# Patient Record
Sex: FEMALE | NOT HISPANIC OR LATINO | Employment: FULL TIME | URBAN - METROPOLITAN AREA
[De-identification: names, ages, dates, MRNs, and addresses within clinical notes are randomized per-mention and may not be internally consistent; named-entity substitution may affect disease eponyms.]

---

## 2018-02-09 ENCOUNTER — OFFICE VISIT (OUTPATIENT)
Dept: INTERNAL MEDICINE | Age: 33
End: 2018-02-09

## 2018-02-09 VITALS
BODY MASS INDEX: 29.49 KG/M2 | DIASTOLIC BLOOD PRESSURE: 70 MMHG | TEMPERATURE: 97.8 F | WEIGHT: 177 LBS | OXYGEN SATURATION: 96 % | HEART RATE: 88 BPM | HEIGHT: 65 IN | SYSTOLIC BLOOD PRESSURE: 110 MMHG

## 2018-02-09 DIAGNOSIS — R00.2 HEART PALPITATIONS: ICD-10-CM

## 2018-02-09 DIAGNOSIS — F98.8 ATTENTION DEFICIT DISORDER, UNSPECIFIED HYPERACTIVITY PRESENCE: Primary | ICD-10-CM

## 2018-02-09 DIAGNOSIS — E66.3 OVERWEIGHT (BMI 25.0-29.9): ICD-10-CM

## 2018-02-09 DIAGNOSIS — F41.3 OTHER MIXED ANXIETY DISORDERS: ICD-10-CM

## 2018-02-09 PROCEDURE — 99205 OFFICE O/P NEW HI 60 MIN: CPT | Performed by: INTERNAL MEDICINE

## 2018-02-09 PROCEDURE — 93000 ELECTROCARDIOGRAM COMPLETE: CPT | Performed by: INTERNAL MEDICINE

## 2018-02-09 RX ORDER — ESCITALOPRAM OXALATE 10 MG/1
10 TABLET ORAL EVERY MORNING
Qty: 30 TABLET | Refills: 1 | Status: SHIPPED | OUTPATIENT
Start: 2018-02-09 | End: 2018-04-30

## 2018-02-09 NOTE — ASSESSMENT & PLAN NOTE
Recommended neuropsych testing for ADD/ADHD. High probability. Medication would be helpful for her.

## 2018-02-09 NOTE — PROGRESS NOTES
"[facility] – [place], [place] INTERNAL MEDICINE  ALDO SORENSON M.D.      Christianne Barrett / 32 y.o. / female  02/09/2018    VITALS:    Visit Vitals   • /70   • Pulse 88   • Temp 97.8 °F (36.6 °C)   • Ht 165.1 cm (65\")   • Wt 80.3 kg (177 lb)   • SpO2 96%   • BMI 29.45 kg/m2       BP Readings from Last 3 Encounters:   02/09/18 110/70     Wt Readings from Last 3 Encounters:   02/09/18 80.3 kg (177 lb)      Body mass index is 29.45 kg/(m^2).    CC: Main reason(s) for today's visit: Establish Care; Palpitations; and Anxiety      HPI:    Patient is a 32 y.o. female who is here to establish care. 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 She c/o of chronic h/o anxiety for most of her adult life. She has had h/o postpartum depression with her 1st child 5 years ago, h/o what sounds like severe premenstrual mood symptoms. Strong family h/o \"anxiety disorder\" and ADD/ADHD in her mom, sister and MGM. She took on a new role at work as  a year ago and has felt overwhelmed and more stressed which is affecting her work performance and personal/marital life with  and her kids. She c/o difficulty remaining focused/attentive/sitting in one place to accomplish tasks. She always feels she is not completing her work tasks and feels overhwelmed w/ everyday home chores/activities. She has been angry/irritated and her  has referred to her as \"crazy.\" She has multiple mild/moderate phobias. Has been experiencing heart palpitations with fast beats up to 110s. Denies chest pain, bourgeois or faint feelings.       Patient Care Team:  Neeraj Sorenson MD as PCP - General (Internal Medicine)  Syd Locke MD as Consulting Physician (Obstetrics and Gynecology)  ____________________________________________________________________    ASSESSMENT & PLAN:    Problem List Items Addressed This Visit        High    Other mixed anxiety disorders    Current Assessment & Plan     Underlying anxiety disorder with worsening problem " relating to her likely ADD/ADHD.   Recommended CBT. Start escitalopram 10 mg 1/2 tab daily x 1 week then increase to 1 full tab. Instructed patient to watch for worsening mood symptoms, suicidal thoughts/ideations. Avoid caffeine much as possible.          Relevant Medications    escitalopram (LEXAPRO) 10 MG tablet    Other Relevant Orders    Celiac Ab tTG DGP TIgA    Heart palpitations    Current Assessment & Plan     Avoid caffeine. EKG today is benign. Check labs. Call if worse/ongoing.          Relevant Orders    Comprehensive Metabolic Panel    TSH+Free T4    CBC & Differential       Medium    Attention deficit disorder - Primary    Current Assessment & Plan     Recommended neuropsych testing for ADD/ADHD. High probability. Medication would be helpful for her.             Low    Overweight (BMI 25.0-29.9)    Current Assessment & Plan     Continue good lifestyle changes with diet/exercise.                 Summary/Discussion:     · Spent 60 minutes in face-to-face encounter time and >50% of time spent in counseling regarding above medical problems/issues.        Return in about 1 month (around 3/9/2018) for Anxiety.    Future Appointments  Date Time Provider Department Center   3/9/2018 2:40 PM Neeraj Sorenson MD [facility] None       ____________________________________________________________________        REVIEW OF SYSTEMS    Review of Systems   Constitutional: Negative.         Intended wt loss of 15 lbs with exercise/diet   HENT: Negative.    Eyes: Negative.    Respiratory: Negative.    Cardiovascular: Positive for palpitations. Negative for chest pain.   Gastrointestinal: Positive for constipation.   Endocrine: Negative.    Genitourinary: Negative.    Musculoskeletal: Negative.    Skin: Negative.    Allergic/Immunologic: Negative.    Neurological: Negative.    Hematological: Negative.    Psychiatric/Behavioral: Positive for agitation, decreased concentration and dysphoric mood. Negative for hallucinations,  self-injury, sleep disturbance and suicidal ideas. The patient is nervous/anxious.          PHYSICAL EXAMINATION    Physical Exam   Constitutional: She is oriented to person, place, and time. She appears well-developed and well-nourished. No distress.   Overweight     HENT:   Head: Normocephalic and atraumatic.   Right Ear: Tympanic membrane normal.   Left Ear: Tympanic membrane normal.   Mouth/Throat: Oropharynx is clear and moist and mucous membranes are normal. No oral lesions.   Eyes: Conjunctivae and EOM are normal. Pupils are equal, round, and reactive to light. No scleral icterus.   Neck: Neck supple. No tracheal deviation present. No thyroid mass and no thyromegaly present.   Cardiovascular: Normal rate, regular rhythm, normal heart sounds and intact distal pulses.    Pulmonary/Chest: Effort normal and breath sounds normal.   Abdominal: Soft. Bowel sounds are normal. She exhibits no distension and no mass. There is no tenderness. No hernia.   Musculoskeletal: She exhibits no edema.   Lymphadenopathy:     She has no cervical adenopathy.        Right: No supraclavicular adenopathy present.        Left: No supraclavicular adenopathy present.   Neurological: She is alert and oriented to person, place, and time. She has normal reflexes. No cranial nerve deficit. She exhibits normal muscle tone. Coordination normal.   Skin: Skin is warm. No rash noted. No pallor.   Psychiatric: Her speech is normal and behavior is normal. Judgment and thought content normal. Her mood appears anxious. She is not actively hallucinating. Cognition and memory are normal. Depressed: dysphoric. She expresses no suicidal ideation. She is attentive.       REVIEWED DATA:    Labs:   No results found for: NA, K, AST, ALT, BUN, CREATININE, EGFRIFNONA, EGFRIFAFRI    No results found for: GLU, HGBA1C, MICROALBUR    No results found for: LDL, HDL, TRIG, CHOLHDLRATIO    No results found for: TSH, FREET4     No results found for: WBC, HGB,  PLT      Imaging:        Medical Tests:   EKG Performed today and reviewed results with patient: normal EKG, normal sinus rhythm, there are no previous tracings available for comparison.   Rhythm strip shows NSR.      Summary of old records / correspondence / consultant report:        Request outside records:        ______________________________________________________________________    ALLERGIES  No Known Allergies     MEDICATIONS  Current Outpatient Prescriptions   Medication Sig Dispense Refill   • escitalopram (LEXAPRO) 10 MG tablet Take 1 tablet by mouth Every Morning. 30 tablet 1     No current facility-administered medications for this visit.        PFSH:     The following portions of the patient's history were reviewed and updated as appropriate: Allergies / Current Medications / Past Medical History / Surgical History / Social History / Family History    PROBLEM LIST   Patient Active Problem List   Diagnosis   • Attention deficit disorder   • Other mixed anxiety disorders   • Heart palpitations   • Overweight (BMI 25.0-29.9)       PAST MEDICAL HISTORY  Past Medical History:   Diagnosis Date   • PMDD (premenstrual dysphoric disorder)    • Postpartum depression        SURGICAL HISTORY  History reviewed. No pertinent surgical history.    SOCIAL HISTORY  Social History     Social History   • Marital status:      Spouse name: Todd   • Number of children: 2   • Years of education: N/A     Occupational History   • [employer] ( at [address])      Social History Main Topics   • Smoking status: Former Smoker     Packs/day: 0.10     Years: 8.00     Types: Electronic Cigarette, Cigarettes     Quit date: 2012   • Smokeless tobacco: Never Used      Comment: was a social smoker   • Alcohol use 1.8 oz/week     3 Glasses of wine per week   • Drug use: No   • Sexual activity: Yes     Partners: Male     Birth control/ protection: IUD     Other Topics Concern   • None     Social History Narrative    • None       FAMILY HISTORY  Family History   Problem Relation Age of Onset   • Cancer Maternal Grandmother      unknown primary   • Diabetes Maternal Grandmother    • Hypertension Maternal Grandmother    • ADD / ADHD Maternal Grandmother    • Anxiety disorder Mother    • ADD / ADHD Mother    • No Known Problems Father    • Anxiety disorder Sister    • ADD / ADHD Sister    • No Known Problems Paternal Grandmother    • No Known Problems Paternal Grandfather          **Denise Disclaimer:   Much of this encounter note is an electronic transcription/translation of spoken language to printed text. The electronic translation of spoken language may permit erroneous, or at times, nonsensical words or phrases to be inadvertently transcribed. Although I have reviewed the note for such errors, some may still exist.

## 2018-02-09 NOTE — ASSESSMENT & PLAN NOTE
Underlying anxiety disorder with worsening problem relating to her likely ADD/ADHD.   Recommended CBT. Start escitalopram 10 mg 1/2 tab daily x 1 week then increase to 1 full tab. Instructed patient to watch for worsening mood symptoms, suicidal thoughts/ideations. Avoid caffeine much as possible.

## 2018-02-10 LAB
ALBUMIN SERPL-MCNC: 5 G/DL (ref 3.5–5.2)
ALBUMIN/GLOB SERPL: 1.8 G/DL
ALP SERPL-CCNC: 62 U/L (ref 39–117)
ALT SERPL-CCNC: 18 U/L (ref 1–33)
AST SERPL-CCNC: 14 U/L (ref 1–32)
BASOPHILS # BLD AUTO: 0.02 10*3/MM3 (ref 0–0.2)
BASOPHILS NFR BLD AUTO: 0.2 % (ref 0–1.5)
BILIRUB SERPL-MCNC: 0.4 MG/DL (ref 0.1–1.2)
BUN SERPL-MCNC: 10 MG/DL (ref 6–20)
BUN/CREAT SERPL: 13.7 (ref 7–25)
CALCIUM SERPL-MCNC: 10 MG/DL (ref 8.6–10.5)
CHLORIDE SERPL-SCNC: 99 MMOL/L (ref 98–107)
CO2 SERPL-SCNC: 24.1 MMOL/L (ref 22–29)
CREAT SERPL-MCNC: 0.73 MG/DL (ref 0.57–1)
EOSINOPHIL # BLD AUTO: 0.03 10*3/MM3 (ref 0–0.7)
EOSINOPHIL NFR BLD AUTO: 0.3 % (ref 0.3–6.2)
ERYTHROCYTE [DISTWIDTH] IN BLOOD BY AUTOMATED COUNT: 12 % (ref 11.7–13)
GFR SERPLBLD CREATININE-BSD FMLA CKD-EPI: 113 ML/MIN/1.73
GFR SERPLBLD CREATININE-BSD FMLA CKD-EPI: 94 ML/MIN/1.73
GLIADIN PEPTIDE IGA SER-ACNC: 11 UNITS (ref 0–19)
GLIADIN PEPTIDE IGG SER-ACNC: 4 UNITS (ref 0–19)
GLOBULIN SER CALC-MCNC: 2.8 GM/DL
GLUCOSE SERPL-MCNC: 85 MG/DL (ref 65–99)
HCT VFR BLD AUTO: 47.5 % (ref 35.6–45.5)
HGB BLD-MCNC: 15.4 G/DL (ref 11.9–15.5)
IGA SERPL-MCNC: 239 MG/DL (ref 87–352)
IMM GRANULOCYTES # BLD: 0.02 10*3/MM3 (ref 0–0.03)
IMM GRANULOCYTES NFR BLD: 0.2 % (ref 0–0.5)
LYMPHOCYTES # BLD AUTO: 3.19 10*3/MM3 (ref 0.9–4.8)
LYMPHOCYTES NFR BLD AUTO: 34.3 % (ref 19.6–45.3)
MCH RBC QN AUTO: 31 PG (ref 26.9–32)
MCHC RBC AUTO-ENTMCNC: 32.4 G/DL (ref 32.4–36.3)
MCV RBC AUTO: 95.8 FL (ref 80.5–98.2)
MONOCYTES # BLD AUTO: 0.48 10*3/MM3 (ref 0.2–1.2)
MONOCYTES NFR BLD AUTO: 5.2 % (ref 5–12)
NEUTROPHILS # BLD AUTO: 5.57 10*3/MM3 (ref 1.9–8.1)
NEUTROPHILS NFR BLD AUTO: 59.8 % (ref 42.7–76)
PLATELET # BLD AUTO: 280 10*3/MM3 (ref 140–500)
POTASSIUM SERPL-SCNC: 4.2 MMOL/L (ref 3.5–5.2)
PROT SERPL-MCNC: 7.8 G/DL (ref 6–8.5)
RBC # BLD AUTO: 4.96 10*6/MM3 (ref 3.9–5.2)
SODIUM SERPL-SCNC: 140 MMOL/L (ref 136–145)
T4 FREE SERPL-MCNC: 1.34 NG/DL (ref 0.93–1.7)
TSH SERPL DL<=0.005 MIU/L-ACNC: 0.74 MIU/ML (ref 0.27–4.2)
TTG IGA SER-ACNC: <2 U/ML (ref 0–3)
TTG IGG SER-ACNC: <2 U/ML (ref 0–5)
WBC # BLD AUTO: 9.31 10*3/MM3 (ref 4.5–10.7)

## 2018-03-09 ENCOUNTER — TELEPHONE (OUTPATIENT)
Dept: INTERNAL MEDICINE | Age: 33
End: 2018-03-09

## 2018-03-09 NOTE — TELEPHONE ENCOUNTER
Send as My Chart message.    Christianne it was good to see you yesterday at Dr. Lam’s office. As we sort of discussed, try taking 1/2 the tablet at night and increase dose only if you feel acclimated. If you don’t feel better just let me know and we will try another medication. Have a great weekend!

## 2018-03-09 NOTE — TELEPHONE ENCOUNTER
----- Message from Christianne Barrett sent at 3/9/2018  7:37 AM EST -----  Regarding: Prescription Question  Contact: [phone]  Hello,  How long before the lexapro stops making me tired.  I am on 10mg and have only been taking half a day at night time because it made me so sleepy. I am having a hard time finding energy through the day. I started taking a b12 vitamin daily since feb. 12th and still very sleepy. Is there a different antidepressant that does not make you so sleepy? I am calling today to make another appointment but was not sure if we could switch it up so I do not feel like a zombie.   Thanks!

## 2018-03-09 NOTE — TELEPHONE ENCOUNTER
----- Message from Christianne Barrett sent at 3/9/2018  7:45 AM EST -----  Regarding: RE:  Contact: [phone]  Ok I will! I just wanted to let you know too that I have a testing appointment with [facility] for the adhd. They are in network with my insurance. Aaron was not. Have a great weekend and I hope your tooth is ok!    ----- Message -----  From: TAMMY ALLEN  Sent: 3/9/18, 7:43 AM  To: Christianne Barrett  Subject:         Christianne,     it was good to see you yesterday at Dr. Lam's office. As we sort of discussed, try taking 1/2 the tablet at night and increase dose only if you feel acclimated. If you don't feel better just let me know and we will try another medication. Have a great weekend!     Neeraj Sorenson MD

## 2018-03-27 ENCOUNTER — OFFICE VISIT (OUTPATIENT)
Dept: INTERNAL MEDICINE | Age: 33
End: 2018-03-27

## 2018-03-27 ENCOUNTER — TELEPHONE (OUTPATIENT)
Dept: INTERNAL MEDICINE | Age: 33
End: 2018-03-27

## 2018-03-27 VITALS
HEIGHT: 65 IN | WEIGHT: 170 LBS | HEART RATE: 92 BPM | TEMPERATURE: 98.3 F | OXYGEN SATURATION: 100 % | DIASTOLIC BLOOD PRESSURE: 70 MMHG | BODY MASS INDEX: 28.32 KG/M2 | SYSTOLIC BLOOD PRESSURE: 116 MMHG

## 2018-03-27 DIAGNOSIS — J11.1 INFLUENZA: Primary | ICD-10-CM

## 2018-03-27 DIAGNOSIS — J02.9 PHARYNGITIS, UNSPECIFIED ETIOLOGY: ICD-10-CM

## 2018-03-27 LAB
EXPIRATION DATE: NORMAL
INTERNAL CONTROL: NORMAL
Lab: NORMAL
S PYO AG THROAT QL: NEGATIVE

## 2018-03-27 PROCEDURE — 87880 STREP A ASSAY W/OPTIC: CPT | Performed by: NURSE PRACTITIONER

## 2018-03-27 PROCEDURE — 99213 OFFICE O/P EST LOW 20 MIN: CPT | Performed by: NURSE PRACTITIONER

## 2018-03-27 RX ORDER — OSELTAMIVIR PHOSPHATE 75 MG/1
75 CAPSULE ORAL 2 TIMES DAILY
Qty: 10 CAPSULE | Refills: 0 | Status: SHIPPED | OUTPATIENT
Start: 2018-03-27 | End: 2018-03-27 | Stop reason: SDUPTHER

## 2018-03-27 RX ORDER — OSELTAMIVIR PHOSPHATE 75 MG/1
75 CAPSULE ORAL 2 TIMES DAILY
Qty: 10 CAPSULE | Refills: 0 | Status: SHIPPED | OUTPATIENT
Start: 2018-03-27 | End: 2018-04-01

## 2018-03-27 NOTE — PROGRESS NOTES
"Blaise Barrett is a 32 y.o. female.     URI    This is a new problem. Maximum temperature: subjective. Associated symptoms include congestion, diarrhea (this AM), headaches, neck pain, rhinorrhea, sinus pain, a sore throat and swollen glands. Pertinent negatives include no abdominal pain, coughing, ear pain, nausea or vomiting. Associated symptoms comments: myalgias. She has tried NSAIDs for the symptoms.    She has had flu vaccination this season. 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 She states \"feels like I've been hit by a truck\".     The following portions of the patient's history were reviewed and updated as appropriate: allergies, current medications, past family history, past medical history, past social history, past surgical history and problem list.    Review of Systems   Constitutional: Positive for appetite change, chills, fatigue and fever.   HENT: Positive for congestion, rhinorrhea and sore throat. Negative for ear pain.    Respiratory: Negative for cough.    Gastrointestinal: Positive for diarrhea (this AM). Negative for abdominal pain, nausea and vomiting.   Musculoskeletal: Positive for myalgias and neck pain.   Neurological: Positive for headaches.       Objective   Physical Exam   Constitutional: She is oriented to person, place, and time. Vital signs are normal. She appears well-developed and well-nourished. She is active and cooperative. She does not appear ill. No distress.   HENT:   Head: Normocephalic and atraumatic.   Right Ear: Hearing, tympanic membrane, external ear and ear canal normal.   Left Ear: Hearing, tympanic membrane, external ear and ear canal normal.   Nose: Nose normal. Right sinus exhibits no maxillary sinus tenderness and no frontal sinus tenderness. Left sinus exhibits no maxillary sinus tenderness and no frontal sinus tenderness.   Mouth/Throat: Uvula is midline and mucous membranes are normal. Posterior oropharyngeal erythema " present. No tonsillar exudate.   Cardiovascular: Normal rate, regular rhythm and normal heart sounds.    No murmur heard.  Pulmonary/Chest: Effort normal and breath sounds normal. No respiratory distress. She has no wheezes. She has no rales.   Lymphadenopathy:        Head (right side): No submental, no submandibular, no tonsillar, no preauricular, no posterior auricular and no occipital adenopathy present.        Head (left side): No submental, no submandibular, no tonsillar, no preauricular, no posterior auricular and no occipital adenopathy present.     She has cervical adenopathy.        Right cervical: Superficial cervical adenopathy present.        Left cervical: Superficial cervical adenopathy present.   Neurological: She is alert and oriented to person, place, and time.   Skin: Skin is warm, dry and intact.   Psychiatric: She has a normal mood and affect. Her speech is normal and behavior is normal. Thought content normal.   Nursing note and vitals reviewed.        Assessment/Plan   Problems Addressed this Visit     None      Visit Diagnoses     Influenza    -  Primary    Relevant Medications    oseltamivir (TAMIFLU) 75 MG capsule    Pharyngitis, unspecified etiology        Relevant Orders    POC Rapid Strep A (Completed)        1. Influenza  High suspicion for influenza based on symptoms. Unable to run rapid test r/t availability of test. Discussed conservative treatment of symptoms, including use of cough suppressants during the day, Tylenol or ibuprofen as needed for body aches, increase by mouth fluids.  Discussed that influenza is usually contagious for 48 hours prior to onset of symptoms up to 5 days after.  Discussed with patient risks versus benefit of taking Tamiflu, patient agrees to treatment.  Discussed possible side effects of Tamiflu and when to stop taking the medication.  Instructed to follow-up if no improvement or worsening of symptoms in the next 5-7 days.      2. Pharyngitis, unspecified  etiology    - POC Rapid Strep A

## 2018-03-27 NOTE — TELEPHONE ENCOUNTER
"Pt called stating she cancelled the Tamiflu Rx sent to [facility] and would like Betzy to send it to [facility] because of \"good Rx\" is cheaper at [facility].  [place] [address] [place], [place] # [phone]  Pt's # [phone]  Thanks SP  "

## 2018-03-27 NOTE — PATIENT INSTRUCTIONS

## 2018-04-30 ENCOUNTER — OFFICE VISIT (OUTPATIENT)
Dept: INTERNAL MEDICINE | Age: 33
End: 2018-04-30

## 2018-04-30 VITALS
TEMPERATURE: 98.5 F | BODY MASS INDEX: 27.66 KG/M2 | OXYGEN SATURATION: 98 % | HEIGHT: 65 IN | DIASTOLIC BLOOD PRESSURE: 78 MMHG | WEIGHT: 166 LBS | HEART RATE: 68 BPM | SYSTOLIC BLOOD PRESSURE: 112 MMHG

## 2018-04-30 DIAGNOSIS — F90.2 ATTENTION DEFICIT HYPERACTIVITY DISORDER (ADHD), COMBINED TYPE: Primary | Chronic | ICD-10-CM

## 2018-04-30 DIAGNOSIS — F41.3 OTHER MIXED ANXIETY DISORDERS: Chronic | ICD-10-CM

## 2018-04-30 PROCEDURE — 99214 OFFICE O/P EST MOD 30 MIN: CPT | Performed by: INTERNAL MEDICINE

## 2018-04-30 RX ORDER — FLUOXETINE HYDROCHLORIDE 20 MG/1
20 CAPSULE ORAL DAILY
Qty: 30 CAPSULE | Refills: 1 | Status: SHIPPED | OUTPATIENT
Start: 2018-04-30 | End: 2018-06-12 | Stop reason: SDUPTHER

## 2018-04-30 NOTE — ASSESSMENT & PLAN NOTE
Not tolerating escitalopram/not effective. Decrease to 1/2 tab for 2 weeks and start fluoxetine 20 mg qd. After 2 weeks increase to 40 mg qd then discontinue escitalopram. Call if any problems.

## 2018-04-30 NOTE — ASSESSMENT & PLAN NOTE
Noted on recent neuropsychiatric evaluation. Will plan to start trial of Vyvanse after addressing her anxiety/depression symptoms.

## 2018-04-30 NOTE — PROGRESS NOTES
"[facility] [place] INTERNAL MEDICINE  ALDO ELKINS M.D.      Christianne Barrett / 32 y.o. / female  04/30/2018      ASSESSMENT & PLAN:    Problem List Items Addressed This Visit        High    Other mixed anxiety disorders (Chronic)    Current Assessment & Plan     Not tolerating escitalopram/not effective. Decrease to 1/2 tab for 2 weeks and start fluoxetine 20 mg qd. After 2 weeks increase to 40 mg qd then discontinue escitalopram. Call if any problems.          Relevant Medications    FLUoxetine (PROzac) 20 MG capsule       Medium    Attention deficit hyperactivity disorder (ADHD), combined type - Primary (Chronic)    Overview     Noted on neuropsychological evaluation.          Current Assessment & Plan     Noted on recent neuropsychiatric evaluation. Will plan to start trial of Vyvanse after addressing her anxiety/depression symptoms.          Relevant Medications    FLUoxetine (PROzac) 20 MG capsule      Other Visit Diagnoses    None.       No orders of the defined types were placed in this encounter.      Summary/Discussion:  Spent 25 minutes in face-to-face encounter time and >50% of time spent in counseling regarding above medical problems/issues.      Return in about 6 weeks (around 6/11/2018) for Reassess today's problem(s).    ____________________________________________________________________    MEDICATIONS  Current Outpatient Prescriptions   Medication Sig Dispense Refill   • FLUoxetine (PROzac) 20 MG capsule Take 1 capsule by mouth Daily. 30 capsule 1     No current facility-administered medications for this visit.          VITALS:    Visit Vitals  /78   Pulse 68   Temp 98.5 °F (36.9 °C)   Ht 165.1 cm (65\")   Wt 75.3 kg (166 lb)   SpO2 98%   BMI 27.62 kg/m²       BP Readings from Last 3 Encounters:   04/30/18 112/78   03/27/18 116/70   02/09/18 110/70     Wt Readings from Last 3 Encounters:   04/30/18 75.3 kg (166 lb)   03/27/18 77.1 kg (170 lb)   02/09/18 80.3 kg (177 lb)      Body mass index is 27.62 kg/m².    CC:  " Main reason(s) for today's visit: Follow-up (psych eval )      HPI:     Patient underwent neuropsychological evaluation with findings consistent with ADHD.  She has significant difficulty in taking focus and organizing her thoughts which affect her functional efficiency in her personal life and work.  She is very much interested in pursuing pharmacological therapy for ADHD.    She has significant generalized anxiety with mild depression.  She is not tolerating escitalopram 10 mg due to sedation and fatigue.  5 mg escitalopram does not seem to be effective in helping with her symptoms.  She has tried Zoloft in the past which she did not tolerate.  She has never tried fluoxetine.    Patient Care Team:  Neeraj Sorenson MD as PCP - General (Internal Medicine)  Syd Locke MD as Consulting Physician (Obstetrics and Gynecology)    ____________________________________________________________________    REVIEW OF SYSTEMS    Review of Systems  Constitutional neg x intended weight loss  CV neg  GI neg  Psych neg for SI      PHYSICAL EXAMINATION    Physical Exam  Constitutional  No distress  Psychiatric  Alert. Judgment and thought content normal. Mood somewhat anxious, not depressed.     REVIEWED DATA:    Labs:     Lab Results   Component Value Date     02/09/2018    K 4.2 02/09/2018    AST 14 02/09/2018    ALT 18 02/09/2018    BUN 10 02/09/2018    CREATININE 0.73 02/09/2018    EGFRIFNONA 94 02/09/2018    EGFRIFAFRI 113 02/09/2018       No results found for: HGBA1C, GLUCOSE, MICROALBUR    No results found for: LDL, HDL, TRIG, CHOLHDLRATIO    Lab Results   Component Value Date    TSH 0.743 02/09/2018    FREET4 1.34 02/09/2018       Lab Results   Component Value Date    WBC 9.31 02/09/2018    HGB 15.4 02/09/2018     02/09/2018       Imaging:         Medical Tests:         Summary of old records / correspondence / consultant report:   Neuropsychiatric evaluation reviewed with her.       Request outside  records:         ALLERGIES  No Known Allergies     PFSH:     The following portions of the patient's history were reviewed and updated as appropriate: Allergies / Current Medications / Past Medical History / Surgical History / Social History / Family History    PROBLEM LIST   Patient Active Problem List   Diagnosis   • Attention deficit hyperactivity disorder (ADHD), combined type   • Other mixed anxiety disorders   • Heart palpitations   • Overweight (BMI 25.0-29.9)       PAST MEDICAL HISTORY  Past Medical History:   Diagnosis Date   • PMDD (premenstrual dysphoric disorder)    • Postpartum depression        SURGICAL HISTORY  History reviewed. No pertinent surgical history.    SOCIAL HISTORY  Social History     Social History   • Marital status:      Spouse name: Todd   • Number of children: 2     Occupational History   • [employer] ( at [address])      Social History Main Topics   • Smoking status: Former Smoker     Packs/day: 0.10     Years: 8.00     Types: Electronic Cigarette, Cigarettes     Quit date: 2012   • Smokeless tobacco: Never Used      Comment: was a social smoker   • Alcohol use 1.8 oz/week     3 Glasses of wine per week   • Drug use: No   • Sexual activity: Yes     Partners: Male     Birth control/ protection: IUD     Other Topics Concern   • Not on file       FAMILY HISTORY  Family History   Problem Relation Age of Onset   • Cancer Maternal Grandmother      unknown primary   • Diabetes Maternal Grandmother    • Hypertension Maternal Grandmother    • ADD / ADHD Maternal Grandmother    • Anxiety disorder Mother    • ADD / ADHD Mother    • No Known Problems Father    • Anxiety disorder Sister    • ADD / ADHD Sister    • No Known Problems Paternal Grandmother    • No Known Problems Paternal Grandfather          **Denise Disclaimer:   Much of this encounter note is an electronic transcription/translation of spoken language to printed text. The electronic translation of spoken  language may permit erroneous, or at times, nonsensical words or phrases to be inadvertently transcribed. Although I have reviewed the note for such errors, some may still exist.

## 2018-05-31 ENCOUNTER — TELEPHONE (OUTPATIENT)
Dept: INTERNAL MEDICINE | Age: 33
End: 2018-05-31

## 2018-05-31 NOTE — TELEPHONE ENCOUNTER
----- Message from Trip Aguilar MA sent at 5/31/2018 10:05 AM EDT -----  Regarding: FW: Prescription Question  Contact: [phone]      ----- Message -----  From: Christianne Barrett  Sent: 5/31/2018  10:04 AM  To: [facility] Ricketts [id number]  Subject: Prescription Question                            Hello!    You told me to send you message about going to 40mg. I am doing great on the 20mg. Can I stay the same?

## 2018-06-12 ENCOUNTER — OFFICE VISIT (OUTPATIENT)
Dept: INTERNAL MEDICINE | Age: 33
End: 2018-06-12

## 2018-06-12 VITALS
OXYGEN SATURATION: 98 % | HEART RATE: 92 BPM | WEIGHT: 165 LBS | TEMPERATURE: 98.2 F | HEIGHT: 65 IN | DIASTOLIC BLOOD PRESSURE: 64 MMHG | BODY MASS INDEX: 27.49 KG/M2 | SYSTOLIC BLOOD PRESSURE: 106 MMHG

## 2018-06-12 DIAGNOSIS — F41.3 OTHER MIXED ANXIETY DISORDERS: Chronic | ICD-10-CM

## 2018-06-12 DIAGNOSIS — F90.2 ATTENTION DEFICIT HYPERACTIVITY DISORDER (ADHD), COMBINED TYPE: Primary | Chronic | ICD-10-CM

## 2018-06-12 PROCEDURE — 99213 OFFICE O/P EST LOW 20 MIN: CPT | Performed by: INTERNAL MEDICINE

## 2018-06-12 RX ORDER — FLUOXETINE HYDROCHLORIDE 40 MG/1
40 CAPSULE ORAL DAILY
Qty: 30 CAPSULE | Refills: 3 | Status: SHIPPED | OUTPATIENT
Start: 2018-06-12

## 2018-06-12 RX ORDER — FLUOXETINE HYDROCHLORIDE 20 MG/1
20 CAPSULE ORAL DAILY
Qty: 30 CAPSULE | Refills: 5 | Status: SHIPPED | OUTPATIENT
Start: 2018-06-12 | End: 2018-06-12 | Stop reason: SDUPTHER

## 2018-06-12 NOTE — PROGRESS NOTES
"[facility]  ALDO ELKINS M.D.      Christianne Barrett / 32 y.o. / female  06/12/2018      ASSESSMENT & PLAN:    Problem List Items Addressed This Visit        High    Other mixed anxiety disorders (Chronic)    Current Assessment & Plan     Try increasing fluoxetine to 40 mg qd. Follow-up in 4 months.          Relevant Medications    FLUoxetine (PROzac) 40 MG capsule       Medium    Attention deficit hyperactivity disorder (ADHD), combined type - Primary (Chronic)    Overview     Noted on neuropsychological evaluation.          Current Assessment & Plan     Will follow for now. Will be switching to a different dental office.          Relevant Medications    FLUoxetine (PROzac) 40 MG capsule        No orders of the defined types were placed in this encounter.      Summary/Discussion:      Return in about 4 months (around 10/12/2018) for Anxiety.    ____________________________________________________________________    MEDICATIONS  Current Outpatient Prescriptions   Medication Sig Dispense Refill   • FLUoxetine (PROzac) 20 MG capsule Take 1 capsule by mouth Daily. 30 capsule 3     No current facility-administered medications for this visit.          VITALS:    Visit Vitals  /64   Pulse 92   Temp 98.2 °F (36.8 °C)   Ht 165.1 cm (65\")   Wt 74.8 kg (165 lb)   SpO2 98%   BMI 27.46 kg/m²       BP Readings from Last 3 Encounters:   06/12/18 106/64   04/30/18 112/78   03/27/18 116/70     Wt Readings from Last 3 Encounters:   06/12/18 74.8 kg (165 lb)   04/30/18 75.3 kg (166 lb)   03/27/18 77.1 kg (170 lb)      Body mass index is 27.46 kg/m².    CC:  Main reason(s) for today's visit: ADHD      HPI:     Doing much with fluoxetine for anxiety. Denies significant problems or side effects. No significant sexual / weight side effects.   Did not increase dose to 40 mg previously.     ADHD: will be transitioning to a different dental office.    Patient Care Team:  Neeraj Elkins MD as PCP - General (Internal " Medicine)  Syd Locke MD as Consulting Physician (Obstetrics and Gynecology)    ____________________________________________________________________    REVIEW OF SYSTEMS    Review of Systems  Constitutional neg for weight gain  Resp neg  CV neg  Neuro neg  Psych decreased tension/anxiety    PHYSICAL EXAMINATION    Physical Exam  Constitutional  No distress  Psychiatric  Alert. Judgment and thought content normal. Mood improving.    REVIEWED DATA:    Labs:     Lab Results   Component Value Date     02/09/2018    K 4.2 02/09/2018    AST 14 02/09/2018    ALT 18 02/09/2018    BUN 10 02/09/2018    CREATININE 0.73 02/09/2018    EGFRIFNONA 94 02/09/2018    EGFRIFAFRI 113 02/09/2018       No results found for: HGBA1C, GLUCOSE, MICROALBUR    No results found for: LDL, HDL, TRIG, CHOLHDLRATIO    Lab Results   Component Value Date    TSH 0.743 02/09/2018    FREET4 1.34 02/09/2018       Lab Results   Component Value Date    WBC 9.31 02/09/2018    HGB 15.4 02/09/2018     02/09/2018       Imaging:         Medical Tests:         Summary of old records / correspondence / consultant report:         Request outside records:         ALLERGIES  No Known Allergies     PFSH:     The following portions of the patient's history were reviewed and updated as appropriate: Allergies / Current Medications / Past Medical History / Surgical History / Social History / Family History    PROBLEM LIST   Patient Active Problem List   Diagnosis   • Attention deficit hyperactivity disorder (ADHD), combined type   • Other mixed anxiety disorders   • Heart palpitations   • Overweight (BMI 25.0-29.9)       PAST MEDICAL HISTORY  Past Medical History:   Diagnosis Date   • PMDD (premenstrual dysphoric disorder)    • Postpartum depression        SURGICAL HISTORY  History reviewed. No pertinent surgical history.    SOCIAL HISTORY  Social History     Social History   • Marital status:      Spouse name: Todd   • Number of  children: 2     Occupational History   • [employer] ( at [employer])      Social History Main Topics   • Smoking status: Former Smoker     Packs/day: 0.10     Years: 8.00     Types: Electronic Cigarette, Cigarettes     Quit date: 2012   • Smokeless tobacco: Never Used      Comment: was a social smoker   • Alcohol use 1.8 oz/week     3 Glasses of wine per week   • Drug use: No   • Sexual activity: Yes     Partners: Male     Birth control/ protection: IUD     Other Topics Concern   • Not on file       FAMILY HISTORY  Family History   Problem Relation Age of Onset   • Cancer Maternal Grandmother         unknown primary   • Diabetes Maternal Grandmother    • Hypertension Maternal Grandmother    • ADD / ADHD Maternal Grandmother    • Anxiety disorder Mother    • ADD / ADHD Mother    • No Known Problems Father    • Anxiety disorder Sister    • ADD / ADHD Sister    • No Known Problems Paternal Grandmother    • No Known Problems Paternal Grandfather          **Dragon Disclaimer:   Much of this encounter note is an electronic transcription/translation of spoken language to printed text. The electronic translation of spoken language may permit erroneous, or at times, nonsensical words or phrases to be inadvertently transcribed. Although I have reviewed the note for such errors, some may still exist.

## 2018-06-20 ENCOUNTER — TELEPHONE (OUTPATIENT)
Dept: INTERNAL MEDICINE | Age: 33
End: 2018-06-20

## 2018-06-20 NOTE — TELEPHONE ENCOUNTER
----- Message from Trip Aguilar MA sent at 6/20/2018  9:27 AM EDT -----  Regarding: FW: Prescription Question  Contact: [phone]      ----- Message -----  From: Christianne Barrett  Sent: 6/20/2018   9:26 AM  To: [facility] [id number] [facility]  Subject: Prescription Question                            Hey Dr. Sorenson!    I have been doing some research and even though the Prozac has helped with my mood swings tremendously, I still have a hard time sitting still, staying on task, feeling scattered brained. The research sounds like the Vivance that we spoke about could help me. Is there a way we could try a small dose? Thanks for all of your help and taking such great care of me!

## 2018-08-24 ENCOUNTER — TELEPHONE (OUTPATIENT)
Dept: INTERNAL MEDICINE | Age: 33
End: 2018-08-24

## 2018-08-24 NOTE — TELEPHONE ENCOUNTER
Patient called in to let us know her Psych Evaluation is not on MyChart. Gave to Savana MONTEJOto copy for scanning.    Can you check this out? I didn't see it.    Thank you

## 2025-03-12 ENCOUNTER — TRANSCRIBE ORDERS (OUTPATIENT)
Dept: LAB | Facility: HOSPITAL | Age: 40
End: 2025-03-12
Payer: COMMERCIAL

## 2025-03-12 ENCOUNTER — LAB (OUTPATIENT)
Dept: LAB | Facility: HOSPITAL | Age: 40
End: 2025-03-12
Payer: COMMERCIAL

## 2025-03-12 DIAGNOSIS — Z01.818 PRE-OP TESTING: Primary | ICD-10-CM

## 2025-03-12 DIAGNOSIS — Z01.818 PRE-OP TESTING: ICD-10-CM

## 2025-03-12 PROCEDURE — 80053 COMPREHEN METABOLIC PANEL: CPT

## 2025-03-12 PROCEDURE — 85025 COMPLETE CBC W/AUTO DIFF WBC: CPT

## 2025-03-12 PROCEDURE — 36415 COLL VENOUS BLD VENIPUNCTURE: CPT

## 2025-03-13 LAB
ALBUMIN SERPL-MCNC: 4.4 G/DL (ref 3.5–5.2)
ALBUMIN/GLOB SERPL: 1.3 G/DL
ALP SERPL-CCNC: 89 U/L (ref 39–117)
ALT SERPL W P-5'-P-CCNC: 19 U/L (ref 1–33)
ANION GAP SERPL CALCULATED.3IONS-SCNC: 12.9 MMOL/L (ref 5–15)
AST SERPL-CCNC: 19 U/L (ref 1–32)
BASOPHILS # BLD AUTO: 0.03 10*3/MM3 (ref 0–0.2)
BASOPHILS NFR BLD AUTO: 0.4 % (ref 0–1.5)
BILIRUB SERPL-MCNC: 0.3 MG/DL (ref 0–1.2)
BUN SERPL-MCNC: 8 MG/DL (ref 6–20)
BUN/CREAT SERPL: 11.8 (ref 7–25)
CALCIUM SPEC-SCNC: 9.5 MG/DL (ref 8.6–10.5)
CHLORIDE SERPL-SCNC: 100 MMOL/L (ref 98–107)
CO2 SERPL-SCNC: 26.1 MMOL/L (ref 22–29)
CREAT SERPL-MCNC: 0.68 MG/DL (ref 0.57–1)
DEPRECATED RDW RBC AUTO: 42.4 FL (ref 37–54)
EGFRCR SERPLBLD CKD-EPI 2021: 115.2 ML/MIN/1.73
EOSINOPHIL # BLD AUTO: 0.12 10*3/MM3 (ref 0–0.4)
EOSINOPHIL NFR BLD AUTO: 1.5 % (ref 0.3–6.2)
ERYTHROCYTE [DISTWIDTH] IN BLOOD BY AUTOMATED COUNT: 12.2 % (ref 12.3–15.4)
GLOBULIN UR ELPH-MCNC: 3.4 GM/DL
GLUCOSE SERPL-MCNC: 79 MG/DL (ref 65–99)
HCT VFR BLD AUTO: 46.1 % (ref 34–46.6)
HGB BLD-MCNC: 15.6 G/DL (ref 12–15.9)
IMM GRANULOCYTES # BLD AUTO: 0.02 10*3/MM3 (ref 0–0.05)
IMM GRANULOCYTES NFR BLD AUTO: 0.2 % (ref 0–0.5)
LYMPHOCYTES # BLD AUTO: 2.73 10*3/MM3 (ref 0.7–3.1)
LYMPHOCYTES NFR BLD AUTO: 33.5 % (ref 19.6–45.3)
MCH RBC QN AUTO: 32.2 PG (ref 26.6–33)
MCHC RBC AUTO-ENTMCNC: 33.8 G/DL (ref 31.5–35.7)
MCV RBC AUTO: 95.1 FL (ref 79–97)
MONOCYTES # BLD AUTO: 0.37 10*3/MM3 (ref 0.1–0.9)
MONOCYTES NFR BLD AUTO: 4.5 % (ref 5–12)
NEUTROPHILS NFR BLD AUTO: 4.87 10*3/MM3 (ref 1.7–7)
NEUTROPHILS NFR BLD AUTO: 59.9 % (ref 42.7–76)
NRBC BLD AUTO-RTO: 0 /100 WBC (ref 0–0.2)
PLATELET # BLD AUTO: 358 10*3/MM3 (ref 140–450)
PMV BLD AUTO: 9.4 FL (ref 6–12)
POTASSIUM SERPL-SCNC: 4.1 MMOL/L (ref 3.5–5.2)
PROT SERPL-MCNC: 7.8 G/DL (ref 6–8.5)
RBC # BLD AUTO: 4.85 10*6/MM3 (ref 3.77–5.28)
SODIUM SERPL-SCNC: 139 MMOL/L (ref 136–145)
WBC NRBC COR # BLD AUTO: 8.14 10*3/MM3 (ref 3.4–10.8)